# Patient Record
Sex: MALE | Race: WHITE | NOT HISPANIC OR LATINO | Employment: FULL TIME | URBAN - METROPOLITAN AREA
[De-identification: names, ages, dates, MRNs, and addresses within clinical notes are randomized per-mention and may not be internally consistent; named-entity substitution may affect disease eponyms.]

---

## 2021-01-01 ENCOUNTER — APPOINTMENT (OUTPATIENT)
Dept: GENERAL RADIOLOGY | Facility: HOSPITAL | Age: 61
End: 2021-01-01

## 2021-01-01 ENCOUNTER — APPOINTMENT (OUTPATIENT)
Dept: CT IMAGING | Facility: HOSPITAL | Age: 61
End: 2021-01-01

## 2021-01-01 ENCOUNTER — HOSPITAL ENCOUNTER (INPATIENT)
Facility: HOSPITAL | Age: 61
LOS: 1 days | End: 2021-07-24
Attending: EMERGENCY MEDICINE | Admitting: INTERNAL MEDICINE

## 2021-01-01 VITALS
DIASTOLIC BLOOD PRESSURE: 88 MMHG | HEART RATE: 117 BPM | RESPIRATION RATE: 16 BRPM | HEIGHT: 66 IN | SYSTOLIC BLOOD PRESSURE: 160 MMHG | BODY MASS INDEX: 30.79 KG/M2 | TEMPERATURE: 101.8 F | OXYGEN SATURATION: 99 % | WEIGHT: 191.58 LBS

## 2021-01-01 DIAGNOSIS — Z66 DNR (DO NOT RESUSCITATE): ICD-10-CM

## 2021-01-01 DIAGNOSIS — I65.22 LEFT-SIDED CAROTID ARTERY OCCLUSION WITHOUT CEREBRAL INFARCTION: Primary | ICD-10-CM

## 2021-01-01 DIAGNOSIS — I10 SEVERE HYPERTENSION: ICD-10-CM

## 2021-01-01 LAB
ANION GAP SERPL CALCULATED.3IONS-SCNC: 16 MMOL/L (ref 5–15)
BASE EXCESS BLDA CALC-SCNC: 1 MMOL/L (ref -5–5)
BASOPHILS # BLD AUTO: 0.02 10*3/MM3 (ref 0–0.2)
BASOPHILS # BLD AUTO: 0.05 10*3/MM3 (ref 0–0.2)
BASOPHILS NFR BLD AUTO: 0.1 % (ref 0–1.5)
BASOPHILS NFR BLD AUTO: 0.2 % (ref 0–1.5)
BUN SERPL-MCNC: 19 MG/DL (ref 8–23)
BUN/CREAT SERPL: 19 (ref 7–25)
CA-I BLDA-SCNC: 1.28 MMOL/L (ref 1.2–1.32)
CA-I SERPL ISE-MCNC: 1.29 MMOL/L (ref 1.12–1.32)
CALCIUM SPEC-SCNC: 10 MG/DL (ref 8.6–10.5)
CHLORIDE SERPL-SCNC: 102 MMOL/L (ref 98–107)
CO2 BLDA-SCNC: 30 MMOL/L (ref 24–29)
CO2 SERPL-SCNC: 20 MMOL/L (ref 22–29)
CREAT BLDA-MCNC: 0.8 MG/DL (ref 0.6–1.3)
CREAT SERPL-MCNC: 1 MG/DL (ref 0.76–1.27)
DEPRECATED RDW RBC AUTO: 37.6 FL (ref 37–54)
DEPRECATED RDW RBC AUTO: 37.9 FL (ref 37–54)
EOSINOPHIL # BLD AUTO: 0.04 10*3/MM3 (ref 0–0.4)
EOSINOPHIL # BLD AUTO: 0.04 10*3/MM3 (ref 0–0.4)
EOSINOPHIL NFR BLD AUTO: 0.2 % (ref 0.3–6.2)
EOSINOPHIL NFR BLD AUTO: 0.2 % (ref 0.3–6.2)
ERYTHROCYTE [DISTWIDTH] IN BLOOD BY AUTOMATED COUNT: 12.5 % (ref 12.3–15.4)
ERYTHROCYTE [DISTWIDTH] IN BLOOD BY AUTOMATED COUNT: 12.7 % (ref 12.3–15.4)
FLUAV RNA RESP QL NAA+PROBE: NOT DETECTED
FLUBV RNA RESP QL NAA+PROBE: NOT DETECTED
GFR SERPL CREATININE-BSD FRML MDRD: 76 ML/MIN/1.73
GLUCOSE BLDC GLUCOMTR-MCNC: 187 MG/DL (ref 70–130)
GLUCOSE BLDC GLUCOMTR-MCNC: 381 MG/DL (ref 70–130)
GLUCOSE BLDC GLUCOMTR-MCNC: 523 MG/DL (ref 70–130)
GLUCOSE SERPL-MCNC: 210 MG/DL (ref 65–99)
HBA1C MFR BLD: 8 % (ref 4.8–5.6)
HCO3 BLDA-SCNC: 28.3 MMOL/L (ref 22–26)
HCT VFR BLD AUTO: 46.5 % (ref 37.5–51)
HCT VFR BLD AUTO: 47.6 % (ref 37.5–51)
HCT VFR BLDA CALC: 50 % (ref 38–51)
HGB BLD-MCNC: 17 G/DL (ref 13–17.7)
HGB BLD-MCNC: 17.6 G/DL (ref 13–17.7)
HGB BLDA-MCNC: 17 G/DL (ref 12–17)
HOLD SPECIMEN: NORMAL
IMM GRANULOCYTES # BLD AUTO: 0.11 10*3/MM3 (ref 0–0.05)
IMM GRANULOCYTES # BLD AUTO: 0.13 10*3/MM3 (ref 0–0.05)
IMM GRANULOCYTES NFR BLD AUTO: 0.5 % (ref 0–0.5)
IMM GRANULOCYTES NFR BLD AUTO: 0.6 % (ref 0–0.5)
INR PPP: 1.1 (ref 0.8–1.2)
LYMPHOCYTES # BLD AUTO: 1.2 10*3/MM3 (ref 0.7–3.1)
LYMPHOCYTES # BLD AUTO: 2.03 10*3/MM3 (ref 0.7–3.1)
LYMPHOCYTES NFR BLD AUTO: 5.2 % (ref 19.6–45.3)
LYMPHOCYTES NFR BLD AUTO: 8.3 % (ref 19.6–45.3)
MCH RBC QN AUTO: 30.3 PG (ref 26.6–33)
MCH RBC QN AUTO: 30.6 PG (ref 26.6–33)
MCHC RBC AUTO-ENTMCNC: 36.6 G/DL (ref 31.5–35.7)
MCHC RBC AUTO-ENTMCNC: 37 G/DL (ref 31.5–35.7)
MCV RBC AUTO: 82.8 FL (ref 79–97)
MCV RBC AUTO: 82.9 FL (ref 79–97)
MONOCYTES # BLD AUTO: 0.63 10*3/MM3 (ref 0.1–0.9)
MONOCYTES # BLD AUTO: 1.96 10*3/MM3 (ref 0.1–0.9)
MONOCYTES NFR BLD AUTO: 2.7 % (ref 5–12)
MONOCYTES NFR BLD AUTO: 8 % (ref 5–12)
NEUTROPHILS NFR BLD AUTO: 20.16 10*3/MM3 (ref 1.7–7)
NEUTROPHILS NFR BLD AUTO: 21.11 10*3/MM3 (ref 1.7–7)
NEUTROPHILS NFR BLD AUTO: 82.8 % (ref 42.7–76)
NEUTROPHILS NFR BLD AUTO: 91.2 % (ref 42.7–76)
NRBC BLD AUTO-RTO: 0 /100 WBC (ref 0–0.2)
NRBC BLD AUTO-RTO: 0 /100 WBC (ref 0–0.2)
PCO2 BLDA: 64.2 MM HG (ref 35–45)
PH BLDA: 7.25 PH UNITS (ref 7.35–7.6)
PLATELET # BLD AUTO: 286 10*3/MM3 (ref 140–450)
PLATELET # BLD AUTO: 343 10*3/MM3 (ref 140–450)
PMV BLD AUTO: 10.1 FL (ref 6–12)
PMV BLD AUTO: 10.1 FL (ref 6–12)
PO2 BLDA: 80 MMHG (ref 80–105)
POTASSIUM BLDA-SCNC: 3.9 MMOL/L (ref 3.5–4.9)
POTASSIUM SERPL-SCNC: 4.1 MMOL/L (ref 3.5–5.2)
PROTHROMBIN TIME: 12.8 SECONDS (ref 12.8–15.2)
QT INTERVAL: 350 MS
QTC INTERVAL: 511 MS
RBC # BLD AUTO: 5.61 10*6/MM3 (ref 4.14–5.8)
RBC # BLD AUTO: 5.75 10*6/MM3 (ref 4.14–5.8)
SAO2 % BLDA: 93 % (ref 95–98)
SARS-COV-2 RNA RESP QL NAA+PROBE: NOT DETECTED
SODIUM BLD-SCNC: 140 MMOL/L (ref 138–146)
SODIUM SERPL-SCNC: 138 MMOL/L (ref 136–145)
WBC # BLD AUTO: 23.13 10*3/MM3 (ref 3.4–10.8)
WBC # BLD AUTO: 24.35 10*3/MM3 (ref 3.4–10.8)
WHOLE BLOOD HOLD SPECIMEN: NORMAL

## 2021-01-01 PROCEDURE — 70498 CT ANGIOGRAPHY NECK: CPT

## 2021-01-01 PROCEDURE — 0 IOPAMIDOL PER 1 ML: Performed by: EMERGENCY MEDICINE

## 2021-01-01 PROCEDURE — 82565 ASSAY OF CREATININE: CPT

## 2021-01-01 PROCEDURE — 94799 UNLISTED PULMONARY SVC/PX: CPT

## 2021-01-01 PROCEDURE — 99223 1ST HOSP IP/OBS HIGH 75: CPT | Performed by: NURSE PRACTITIONER

## 2021-01-01 PROCEDURE — 85025 COMPLETE CBC W/AUTO DIFF WBC: CPT | Performed by: INTERNAL MEDICINE

## 2021-01-01 PROCEDURE — 82803 BLOOD GASES ANY COMBINATION: CPT

## 2021-01-01 PROCEDURE — 82330 ASSAY OF CALCIUM: CPT

## 2021-01-01 PROCEDURE — 0042T HC CT CEREBRAL PERFUSION W/WO CONTRAST: CPT

## 2021-01-01 PROCEDURE — 63710000001 INSULIN REGULAR HUMAN PER 5 UNITS: Performed by: NURSE PRACTITIONER

## 2021-01-01 PROCEDURE — 5A1935Z RESPIRATORY VENTILATION, LESS THAN 24 CONSECUTIVE HOURS: ICD-10-PCS | Performed by: INTERNAL MEDICINE

## 2021-01-01 PROCEDURE — 99238 HOSP IP/OBS DSCHRG MGMT 30/<: CPT | Performed by: INTERNAL MEDICINE

## 2021-01-01 PROCEDURE — 71045 X-RAY EXAM CHEST 1 VIEW: CPT

## 2021-01-01 PROCEDURE — 94003 VENT MGMT INPAT SUBQ DAY: CPT

## 2021-01-01 PROCEDURE — 99223 1ST HOSP IP/OBS HIGH 75: CPT | Performed by: INTERNAL MEDICINE

## 2021-01-01 PROCEDURE — 85610 PROTHROMBIN TIME: CPT

## 2021-01-01 PROCEDURE — 87636 SARSCOV2 & INF A&B AMP PRB: CPT | Performed by: INTERNAL MEDICINE

## 2021-01-01 PROCEDURE — 85014 HEMATOCRIT: CPT

## 2021-01-01 PROCEDURE — 70496 CT ANGIOGRAPHY HEAD: CPT

## 2021-01-01 PROCEDURE — 84132 ASSAY OF SERUM POTASSIUM: CPT

## 2021-01-01 PROCEDURE — 70450 CT HEAD/BRAIN W/O DYE: CPT

## 2021-01-01 PROCEDURE — 82947 ASSAY GLUCOSE BLOOD QUANT: CPT

## 2021-01-01 PROCEDURE — 25010000002 FENTANYL CITRATE (PF) 50 MCG/ML SOLUTION: Performed by: EMERGENCY MEDICINE

## 2021-01-01 PROCEDURE — 83036 HEMOGLOBIN GLYCOSYLATED A1C: CPT | Performed by: NURSE PRACTITIONER

## 2021-01-01 PROCEDURE — 80048 BASIC METABOLIC PNL TOTAL CA: CPT | Performed by: INTERNAL MEDICINE

## 2021-01-01 PROCEDURE — 94002 VENT MGMT INPAT INIT DAY: CPT

## 2021-01-01 PROCEDURE — 93005 ELECTROCARDIOGRAM TRACING: CPT | Performed by: EMERGENCY MEDICINE

## 2021-01-01 PROCEDURE — 84295 ASSAY OF SERUM SODIUM: CPT

## 2021-01-01 PROCEDURE — 25010000002 MIDAZOLAM PER 1 MG: Performed by: EMERGENCY MEDICINE

## 2021-01-01 PROCEDURE — 99285 EMERGENCY DEPT VISIT HI MDM: CPT

## 2021-01-01 PROCEDURE — 82962 GLUCOSE BLOOD TEST: CPT

## 2021-01-01 PROCEDURE — 85025 COMPLETE CBC W/AUTO DIFF WBC: CPT | Performed by: EMERGENCY MEDICINE

## 2021-01-01 PROCEDURE — 82330 ASSAY OF CALCIUM: CPT | Performed by: INTERNAL MEDICINE

## 2021-01-01 PROCEDURE — 74018 RADEX ABDOMEN 1 VIEW: CPT

## 2021-01-01 RX ORDER — SODIUM CHLORIDE 0.9 % (FLUSH) 0.9 %
10 SYRINGE (ML) INJECTION AS NEEDED
Status: DISCONTINUED | OUTPATIENT
Start: 2021-01-01 | End: 2021-01-01 | Stop reason: HOSPADM

## 2021-01-01 RX ORDER — ACETAMINOPHEN 325 MG/1
325 TABLET ORAL EVERY 6 HOURS PRN
Status: DISCONTINUED | OUTPATIENT
Start: 2021-01-01 | End: 2021-01-01

## 2021-01-01 RX ORDER — ACETAMINOPHEN 325 MG/1
650 TABLET ORAL EVERY 6 HOURS PRN
Status: DISCONTINUED | OUTPATIENT
Start: 2021-01-01 | End: 2021-01-01

## 2021-01-01 RX ORDER — DEXTROSE MONOHYDRATE 25 G/50ML
25 INJECTION, SOLUTION INTRAVENOUS
Status: DISCONTINUED | OUTPATIENT
Start: 2021-01-01 | End: 2021-01-01

## 2021-01-01 RX ORDER — ACETAMINOPHEN 650 MG/1
650 SUPPOSITORY RECTAL EVERY 4 HOURS PRN
Status: DISCONTINUED | OUTPATIENT
Start: 2021-01-01 | End: 2021-01-01 | Stop reason: HOSPADM

## 2021-01-01 RX ORDER — ACETAMINOPHEN 325 MG/1
325 TABLET ORAL EVERY 6 HOURS PRN
Status: DISCONTINUED | OUTPATIENT
Start: 2021-01-01 | End: 2021-01-01 | Stop reason: HOSPADM

## 2021-01-01 RX ORDER — NICOTINE POLACRILEX 4 MG
15 LOZENGE BUCCAL
Status: DISCONTINUED | OUTPATIENT
Start: 2021-01-01 | End: 2021-01-01

## 2021-01-01 RX ORDER — MORPHINE SULFATE 2 MG/ML
2 INJECTION, SOLUTION INTRAMUSCULAR; INTRAVENOUS
Status: DISCONTINUED | OUTPATIENT
Start: 2021-01-01 | End: 2021-01-01 | Stop reason: HOSPADM

## 2021-01-01 RX ORDER — LORAZEPAM 2 MG/ML
1 INJECTION INTRAMUSCULAR EVERY 4 HOURS PRN
Status: DISCONTINUED | OUTPATIENT
Start: 2021-01-01 | End: 2021-01-01 | Stop reason: HOSPADM

## 2021-01-01 RX ORDER — MIDAZOLAM HYDROCHLORIDE 1 MG/ML
5 INJECTION INTRAMUSCULAR; INTRAVENOUS ONCE
Status: COMPLETED | OUTPATIENT
Start: 2021-01-01 | End: 2021-01-01

## 2021-01-01 RX ORDER — GLYCOPYRROLATE 0.2 MG/ML
0.2 INJECTION INTRAMUSCULAR; INTRAVENOUS
Status: DISCONTINUED | OUTPATIENT
Start: 2021-01-01 | End: 2021-01-01 | Stop reason: HOSPADM

## 2021-01-01 RX ORDER — ATORVASTATIN CALCIUM 40 MG/1
40 TABLET, FILM COATED ORAL DAILY
Status: DISCONTINUED | OUTPATIENT
Start: 2021-01-01 | End: 2021-01-01

## 2021-01-01 RX ORDER — MORPHINE SULFATE 4 MG/ML
4 INJECTION, SOLUTION INTRAMUSCULAR; INTRAVENOUS
Status: DISCONTINUED | OUTPATIENT
Start: 2021-01-01 | End: 2021-01-01 | Stop reason: HOSPADM

## 2021-01-01 RX ORDER — FENTANYL CITRATE 50 UG/ML
100 INJECTION, SOLUTION INTRAMUSCULAR; INTRAVENOUS ONCE
Status: COMPLETED | OUTPATIENT
Start: 2021-01-01 | End: 2021-01-01

## 2021-01-01 RX ORDER — MORPHINE SULFATE 4 MG/ML
4 INJECTION, SOLUTION INTRAMUSCULAR; INTRAVENOUS
Status: DISCONTINUED | OUTPATIENT
Start: 2021-01-01 | End: 2021-01-01

## 2021-01-01 RX ORDER — ACETAMINOPHEN 325 MG/1
650 TABLET ORAL EVERY 6 HOURS PRN
Status: DISCONTINUED | OUTPATIENT
Start: 2021-01-01 | End: 2021-01-01 | Stop reason: HOSPADM

## 2021-01-01 RX ORDER — MORPHINE SULFATE 2 MG/ML
2 INJECTION, SOLUTION INTRAMUSCULAR; INTRAVENOUS
Status: DISCONTINUED | OUTPATIENT
Start: 2021-01-01 | End: 2021-01-01

## 2021-01-01 RX ORDER — GLYCOPYRROLATE 0.2 MG/ML
0.4 INJECTION INTRAMUSCULAR; INTRAVENOUS ONCE
Status: COMPLETED | OUTPATIENT
Start: 2021-01-01 | End: 2021-01-01

## 2021-01-01 RX ADMIN — NICARDIPINE HYDROCHLORIDE 10 MG/HR: 0.1 INJECTION, SOLUTION INTRAVENOUS at 18:29

## 2021-01-01 RX ADMIN — NICARDIPINE HYDROCHLORIDE 12.5 MG/HR: 0.1 INJECTION, SOLUTION INTRAVENOUS at 22:38

## 2021-01-01 RX ADMIN — ACETAMINOPHEN 650 MG: 325 TABLET ORAL at 02:01

## 2021-01-01 RX ADMIN — GLYCOPYRROLATE 0.4 MG: 0.2 INJECTION INTRAMUSCULAR; INTRAVENOUS at 10:50

## 2021-01-01 RX ADMIN — NICARDIPINE HYDROCHLORIDE 15 MG/HR: 0.1 INJECTION, SOLUTION INTRAVENOUS at 21:23

## 2021-01-01 RX ADMIN — IOPAMIDOL 115 ML: 755 INJECTION, SOLUTION INTRAVENOUS at 18:24

## 2021-01-01 RX ADMIN — MIDAZOLAM 5 MG: 1 INJECTION INTRAMUSCULAR; INTRAVENOUS at 18:23

## 2021-01-01 RX ADMIN — FENTANYL CITRATE 100 MCG: 50 INJECTION, SOLUTION INTRAMUSCULAR; INTRAVENOUS at 18:23

## 2021-01-01 RX ADMIN — INSULIN HUMAN 14 UNITS: 100 INJECTION, SOLUTION PARENTERAL at 23:51

## 2021-01-01 RX ADMIN — NICARDIPINE HYDROCHLORIDE 7.5 MG/HR: 0.1 INJECTION, SOLUTION INTRAVENOUS at 07:25

## 2021-01-01 RX ADMIN — INSULIN HUMAN 2 UNITS: 100 INJECTION, SOLUTION PARENTERAL at 06:19

## 2021-01-01 RX ADMIN — NICARDIPINE HYDROCHLORIDE 15 MG/HR: 0.1 INJECTION, SOLUTION INTRAVENOUS at 20:05

## 2021-07-23 PROBLEM — J96.01 ACUTE RESPIRATORY FAILURE WITH HYPOXIA (HCC): Status: ACTIVE | Noted: 2021-01-01

## 2021-07-23 PROBLEM — I65.22 LEFT CAROTID ARTERY OCCLUSION: Status: ACTIVE | Noted: 2021-01-01

## 2021-07-23 PROBLEM — I65.22: Status: RESOLVED | Noted: 2021-01-01 | Resolved: 2021-01-01

## 2021-07-23 PROBLEM — I63.9 ACUTE CVA (CEREBROVASCULAR ACCIDENT) (HCC): Status: ACTIVE | Noted: 2021-01-01

## 2021-07-23 PROBLEM — I65.22: Status: ACTIVE | Noted: 2021-01-01

## 2021-07-23 NOTE — ED PROVIDER NOTES
Subjective   Patient presents via air medical transport secondary to alteration in mental status and likely COVID-19.  Patient apparently per reports from the crew was not feeling well this morning and went back to bed.  The wife went in this afternoon and was unable to wake the patient.  EMS arrived and he was unresponsive.  The flight crew arrived and he had some movement and agonal respirations but unresponsive.  He was intubated in route.  No medications were provided.  GCS of 6.  Significant hypertension per the flight crew.  Patient is not on any reported blood thinners.      History provided by:  EMS personnel  History limited by:  Intubated and patient unresponsive      Review of Systems   Unable to perform ROS: Intubated       No past medical history on file.    Not on File    No past surgical history on file.    No family history on file.             Objective   Physical Exam  Vitals and nursing note reviewed.   Constitutional:       Appearance: He is obese.      Comments: Patient is overall unresponsive.  Some spontaneous respirations.  Withdraws to pain with stimulation of the lower extremities and left arm.  GCS of 6.   Cardiovascular:      Rate and Rhythm: Normal rate and regular rhythm.      Pulses: Normal pulses.   Pulmonary:      Effort: Pulmonary effort is normal. No respiratory distress.      Breath sounds: Normal breath sounds.   Abdominal:      Palpations: Abdomen is soft.   Musculoskeletal:      Comments: Withdraws to pain in the left upper extremity and bilateral lower extremities.   Skin:     General: Skin is warm and dry.      Capillary Refill: Capillary refill takes less than 2 seconds.   Neurological:      Mental Status: He is unresponsive.      GCS: GCS eye subscore is 1. GCS verbal subscore is 1. GCS motor subscore is 4.         Critical Care  Performed by: Felipe Forbes MD  Authorized by: Felipe Forbes MD     Critical care provider statement:     Critical care time  (minutes):  60    Critical care end time:  7/23/2021 7:24 PM    Critical care time was exclusive of:  Separately billable procedures and treating other patients    Critical care was necessary to treat or prevent imminent or life-threatening deterioration of the following conditions:  CNS failure or compromise, circulatory failure and cardiac failure    Critical care was time spent personally by me on the following activities:  Development of treatment plan with patient or surrogate, discussions with consultants, evaluation of patient's response to treatment, examination of patient, ventilator management, obtaining history from patient or surrogate, ordering and performing treatments and interventions, ordering and review of laboratory studies, ordering and review of radiographic studies, pulse oximetry and re-evaluation of patient's condition               ED Course  ED Course as of Jul 23 1927 Fri Jul 23, 2021 1823 I personally reviewed the CT scan as it was being performed as well as discussed it with the radiologist.  Patient with a large acute appearing insult to the left hemisphere.  See report for radiology for details.   CT Head Without Contrast Stroke Protocol [RS]   1823 I evaluated the patient on arrival with the stroke navigator.  Patient with significant alteration in mental status, intubated prior to arrival.  GCS is 6.  Patient's only neurologic function is withdrawing to pain of the bilateral lower extremities and left upper extremity.    [RS]   1824 Patient not a candidate for TPA secondary to  significantly elevated NIH stroke scale as well as time since last known well.    [RS]   1829 BP(!): 219/109 [RS]   1829 Heart Rate(!): 122 [RS]   1846 WBC(!): 23.13 [RS]   1854 The stroke navigator apparently can talk with the family who elected to make the patient a DNR and nonintervention.  I called to talk with the wife to confirm that decision.  Secondary to the patient's age and potential disability.   The wife wants to discuss it with her son.    [RS]   1920 The family is now at bedside and I had a lengthy discussion with the wife and the son.  Stroke navigators were at the bedside as well.  After discussion the options of interventional procedure versus supportive measures versus withdrawal of care.  The wife and son both agree that the patient would not want any invasive procedures.  They have asked that we maintain supportive measures at this point while other family travels from [place].  This decision was confirmed with the wife, son and in front of the stroke navigator's.    [RS]   1927 Case discussed with Dr. Espinoza who will admit.    [RS]      ED Course User Index  [RS] Felipe Forbes MD                                           MDM  Number of Diagnoses or Management Options  DNR (do not resuscitate)  Left-sided carotid artery occlusion without cerebral infarction  Severe hypertension  Diagnosis management comments: Recent Results (from the past 24 hour(s))  -POC Creatinine  Collection Time: 07/23/21  6:25 PM  Specimen: Blood       Result                      Value             Ref Range           Creatinine                  0.80              0.60 - 1.30 *  -POC Surgery Labs  Collection Time: 07/23/21  6:29 PM  Specimen: Blood       Result                      Value             Ref Range           Ionized Calcium             1.28              1.20 - 1.32 *       POC Potassium               3.9               3.5 - 4.9 mm*       Sodium                      140               138 - 146 mm*       Total CO2                   30 (H)            24 - 29 mmol*       Hemoglobin                  17.0              12.0 - 17.0 *       Hematocrit                  50                38 - 51 %           pCO2, Arterial              64.2 (H)          35 - 45 mm Hg       pO2, Arterial               80                80 - 105 mmHg       Base Excess                 1.0000            -5 - 5 mmol/L       O2 Saturation,  Arterial     93 (L)            95 - 98 %           pH, Arterial                7.25 (L)          7.35 - 7.6 p*       HCO3, Arterial              28.3 (H)          22 - 26 mmol*       Glucose                     381 (H)           70 - 130 mg/*  -CBC Auto Differential  Collection Time: 07/23/21  6:33 PM  Specimen: Blood       Result                      Value             Ref Range           WBC                         23.13 (H)         3.40 - 10.80*       RBC                         5.75              4.14 - 5.80 *       Hemoglobin                  17.6              13.0 - 17.7 *       Hematocrit                  47.6              37.5 - 51.0 %       MCV                         82.8              79.0 - 97.0 *       MCH                         30.6              26.6 - 33.0 *       MCHC                        37.0 (H)          31.5 - 35.7 *       RDW                         12.5              12.3 - 15.4 %       RDW-SD                      37.6              37.0 - 54.0 *       MPV                         10.1              6.0 - 12.0 fL       Platelets                   343               140 - 450 10*       Neutrophil %                91.2 (H)          42.7 - 76.0 %       Lymphocyte %                5.2 (L)           19.6 - 45.3 %       Monocyte %                  2.7 (L)           5.0 - 12.0 %        Eosinophil %                0.2 (L)           0.3 - 6.2 %         Basophil %                  0.1               0.0 - 1.5 %         Immature Grans %            0.6 (H)           0.0 - 0.5 %         Neutrophils, Absolute       21.11 (H)         1.70 - 7.00 *       Lymphocytes, Absolute       1.20              0.70 - 3.10 *       Monocytes, Absolute         0.63              0.10 - 0.90 *       Eosinophils, Absolute       0.04              0.00 - 0.40 *       Basophils, Absolute         0.02              0.00 - 0.20 *       Immature Grans, Absolu*     0.13 (H)          0.00 - 0.05 *       nRBC                        0.0                0.0 - 0.2 /1*  -POC Protime / INR  Collection Time: 07/23/21  6:33 PM  Specimen: Blood       Result                      Value             Ref Range           Protime                     12.8              12.8 - 15.2 *       INR                         1.1               0.8 - 1.2      Note: In addition to lab results from this visit, the labs listed above may include labs taken at another facility or during a different encounter within the last 24 hours. Please correlate lab times with ED admission and discharge times for further clarification of the services performed during this visit.    CT Angiogram Head w AI Analysis of LVO   Preliminary Result    Severe atherosclerotic disease throughout the CTA head and    neck with diminutive caliber and superimposed disease likely within the    bilateral vertebral arteries lack of opacification in the proximal V2    segments and distally throughout the vertebral basilar system and    basilar artery with irregularities throughout the PCA territories.         Occluded left internal carotid artery just distal to its origin with    continued lack of opacification left ICA to the intracranial portions    and only minimal reconstitution the left MCA with irregularities    throughout the bilateral MCA territories and decreased opacification    corresponding left MCA territory greater than right. Additional findings    of abnormalities irregularities in the SIA territory right and left of    moderate to severe stenoses               CT CEREBRAL PERFUSION WITH & WITHOUT CONTRAST   Preliminary Result    Abnormal perfusion with large area of her bursal ischemia    involving the left cerebral hemisphere with core infarct or    nonreversible ischemia in the left temporal occipital region as well as    reversible ischemia in the right posterior SIA territory.               CT Angiogram Neck   Preliminary Result    Severe atherosclerotic disease throughout the CTA head and    neck  with diminutive caliber and superimposed disease likely within the    bilateral vertebral arteries lack of opacification in the proximal V2    segments and distally throughout the vertebral basilar system and    basilar artery with irregularities throughout the PCA territories.         Occluded left internal carotid artery just distal to its origin with    continued lack of opacification left ICA to the intracranial portions    and only minimal reconstitution the left MCA with irregularities    throughout the bilateral MCA territories and decreased opacification    corresponding left MCA territory greater than right. Additional findings    of abnormalities irregularities in the SIA territory right and left of    moderate to severe stenoses               CT Head Without Contrast Stroke Protocol   Preliminary Result    Low-attenuation area extending to the cortex left    temporoparietal region with sulcal effacement and edema concerning for    evolving left MCA territory infarction         Scan performed on 07/23/2021 at 1814 hours. Scan report given to ER    physician Dr. Forbes in person by Dr. Ramesh at scanner on 07/23/2021 1820    hours               XR Chest 1 View    (Results Pending)  --------------------------------------------------------------            07/23/21 07/23/21 07/23/21 07/23/21                1845          1848          1900      1915      --------------------------------------------------------------   BP:     (!) 218/111                 (!) 186/91(!) 170/110   BP Location:                            Right arm               Patient Position:                             Sitting                Pulse:    (!) 125                    (!) 123    (!) 130     Resp:                                   16                  Temp:              98.2 °F (36.8 °C)                        TempSrc:               Axillary                              "SpO2:       98%                        98%        98%       Weight:            97.5 kg (215 lb)                         Height:             167.6 cm (66\")                         --------------------------------------------------------------  Medications  sodium chloride 0.9 % flush 10 mL (has no administration in time range)  niCARdipine (CARDENE) 20 mg in 200 mL NS infusion (15 mg/hr Intravenous Rate/Dose Change 7/23/21 1901)  fentaNYL citrate (PF) (SUBLIMAZE) injection 100 mcg (100 mcg Intravenous Given 7/23/21 1823)  midazolam (VERSED) injection 5 mg (5 mg Intravenous Given 7/23/21 1823)  iopamidol (ISOVUE-370) 76 % injection 150 mL (115 mL Intravenous Given 7/23/21 1824)  ECG/EMG Results (last 24 hours)     Procedure Component Value Units Date/Time    ECG 12 Lead ([id number]) Collected: 07/23/21 1847     Updated: 07/23/21 1847      ECG 12 Lead               Amount and/or Complexity of Data Reviewed  Clinical lab tests: reviewed  Tests in the radiology section of CPT®: reviewed  Decide to obtain previous medical records or to obtain history from someone other than the patient: yes  Obtain history from someone other than the patient: yes  Discuss the patient with other providers: yes  Independent visualization of images, tracings, or specimens: yes    Risk of Complications, Morbidity, and/or Mortality  Presenting problems: high    Critical Care  Total time providing critical care: 30-74 minutes      Final diagnoses:   Left-sided carotid artery occlusion without cerebral infarction   Severe hypertension   DNR (do not resuscitate)       ED Disposition  ED Disposition     ED Disposition Condition Comment    Decision to Admit  Level of Care: Critical Care [6]   Admitting Physician: VALERIE GOMEZ [[id number]]            No follow-up provider specified.       Medication List      No changes were made to your prescriptions during this visit.          Felipe Forbes MD  07/23/21 1926     "   Felipe Forbes MD  07/23/21 1927

## 2021-07-23 NOTE — CONSULTS
Stroke Consult Note    Patient Name: Serge Keith   MRN: [id number]  Age: 61 y.o.  Sex: male  : 1960    Primary Care Physician: No primary care provider on file.  Referring Physician:  No ref. provider found    TIME STROKE TEAM CALLED:  EST     TIME PATIENT SEEN:  EST    Handedness: unknown    Race:       Chief Complaint/Reason for Consultation: unresponsive     HPI:   Serge Keith is a 61 year old  male with unknown medical history. He presents to [facility] ED today as a scene flight due to unresponsiveness. He is intubated but not sedated. He is non-verbal at this time. He is responsive to painful stimuli on bilateral lower extremities R<L. Minimally responsive in left upper extremity.  No response in right upper extremity to painful stimuli.  Per EMS report patient was last in his usual state of health last night before bed.  This morning when he did not get up his wife went to check on him and he would not get out of bed but did speak to her and had slurred words.  Wife reports he had been doing vigorous labor for the last 2 days and she felt he needed more rest so allowed him to sleep several more hours.  When she went back to check on him in the afternoon he was completely unresponsive and had lost control of his bowel and bladder.  EMS was called.    On arrival to our facility he was taken urgently for advanced imaging.  CT of the head without contrast demonstrated some low-attenuation changes in the left MCA territory concerning for acute infarction.  CTA of the head and neck demonstrated a left ICA occlusion and CT perfusion confirmed large area of ischemia  involving the left cerebral hemisphere with core infarct or nonreversible ischemia in the left temporal occipital region as well as reversible ischemia in the right posterior SIA territory.  Imaging was reviewed with neuro interventionalists on-call.  Patient was a candidate for mechanical thrombectomy.  I reached  out to patient's wife who was in route with her son to the hospital.  I discussed with patient's wife his current condition and the stroke that he was currently having.  I reviewed with her risks and benefits of proceeding with going to the Cath Lab.  I answered all questions concerning procedure and possible outcomes.  Ultimately she expressed his wishes would not be to pursue aggressive treatment and that she does not wish for him to go for the procedure.  She discussed this with her son at that time.  Dr. Forbes the ED physician also spoke with family about CODE STATUS and goals of care.  At that time he also explained the procedure again himself.  They took a few moments to consider again and after group discussion again decided they would not like to proceed.  They do however wish to continue mechanical ventilation at this time and medical management so that family can come from [place] to be with the patient.         Last Known Normal Date/Time: yesterday evening      Review of Systems   Unable to perform ROS: Patient unresponsive        Temp:  [98.2 °F (36.8 °C)] 98.2 °F (36.8 °C)  Heart Rate:  [122] 122  Resp:  [18] 18  BP: (219)/(109) 219/109    Neurological Exam  Mental Status  Responsive to painful stimuli. Patient is nonverbal.  Patient is intubated and unresponsive..    Motor    Withdraws to painful stimuli in BLE and LUE. No movement in RUE.    Sensory  Withdraws to painful stimuli in BLE and LUE. No movement in RUE.     Reflexes                                           Right                      Left  Plantar                           Downgoing                Upgoing    Coordination  Patient unable to participate .    Gait  Patient unable to participate .      Physical Exam  Vitals reviewed.   Constitutional:       Appearance: He is ill-appearing.   HENT:      Head: Normocephalic.      Mouth/Throat:      Comments: Intubated   Cardiovascular:      Rate and Rhythm: Tachycardia present.   Pulmonary:       Effort: No respiratory distress.   Skin:     General: Skin is warm and dry.   Neurological:      Cranial Nerves: Cranial nerve deficit present.      Sensory: Sensory deficit present.      Motor: Weakness present.         Acute Stroke Data    Alteplase (tPA) Inclusion / Exclusion Criteria    Time: 18:51 EDT  Person Administering Scale: ARIN Tejeda    Inclusion Criteria  [x]   18 years of age or greater   []   Onset of symptoms < 4.5 hours before beginning treatment (stroke onset = time patient was last seen well or without symptoms).   []   Diagnosis of acute ischemic stroke causing measurable disabling deficit (Complete Hemianopia, Any Aphasia, Visual or Sensory Extinction, Any weakness limiting sustained effort against gravity)   []   Any remaining deficit considered potentially disabling in view of patient and practitioner   Exclusion criteria (Do not proceed with Alteplase if any are checked under exclusion criteria)  [x]   Onset unknown or GREATER than 4.5 hours   []   ICH on CT/MRI   []   CT demonstrates hypodensity representing acute or subacute infarct   []   Significant head trauma or prior stroke in the previous 3 months   []   Symptoms suggestive of subarachnoid hemorrhage   []   History of un-ruptured intracranial aneurysm GREATER than 10 mm   []   Recent intracranial or intraspinal surgery within the last 3 months   []   Arterial puncture at a non-compressible site in the previous 7 days   []   Active internal bleeding   []   Acute bleeding tendency   []   Platelet count LESS than 100,000 for known hematological diseases such as leukemia, thrombocytopenia or chronic cirrhosis   []   Current use of anticoagulant with INR GREATER than 1.7 or PT GREATER than 15 seconds, aPTT GREATER than 40 seconds   []   Heparin received within 48 hours, resulting in abnormally elevated aPTT GREATER than upper limit of normal   []   Current use of direct thrombin inhibitors or direct factor Xa inhibitors  in the past 48 hours   []   Elevated blood pressure refractory to treatment (systolic GREATER than 185 mm/Hg or diastolic  GREATER than 110 mm/Hg   []   Suspected infective endocarditis and aortic arch dissection   []   Current use of therapeutic treatment dose of low-molecular-weight heparin (LMWH) within the previous 24 hours   []   Structural GI malignancy or bleed   Relative exclusion for all patients  []   Only minor non-disabling symptoms   []   Pregnancy   []   Seizure at onset with postictal residual neurological impairments   []   Major surgery or previous trauma within past 14 days   []   History of previous spontaneous ICH, intracranial neoplasm, or AV malformation   []   Postpartum (within previous 14 days)   []   Recent GI or urinary tract hemorrhage (within previous 21 days)   []   Recent acute MI (within previous 3 months)   []   History of un-ruptured intracranial aneurysm LESS than 10 mm   []   History of ruptured intracranial aneurysm   []   Blood glucose LESS than 50 mg/dL (2.7 mmol/L)   []   Dural puncture within the last 7 days   []   Known GREATER than 10 cerebral microbleeds   Additional exclusions for patients with symptoms onset between 3 and 4.5 hours.  []   Age > 80.   []   On any anticoagulants regardless of INR  >>> Warfarin (Coumadin), Heparin, Enoxaparin (Lovenox), fondaparinux (Arixtra), bivalirudin (Angiomax), Argatroban, dabigatran (Pradaxa), rivaroxaban (Xarelto), or apixaban (Eliquis)   []   Severe stroke (NIHSS > 25).   []   History of BOTH diabetes and previous ischemic stroke.   []   The risks and benefits have been discussed with the patient or family related to the administration of IV Alteplase for stroke symptoms.   []   I have discussed and reviewed the patient's case and imaging with the attending prior to IV Alteplase.   Not given  Time Alteplase administered       No past medical history on file.  No past surgical history on file.  No family history on file.     Not on  File  Prior to Admission medications    Not on File       Hospital Meds:  Scheduled-    Infusions- niCARdipine, 5-15 mg/hr, Last Rate: 12.5 mg/hr (07/23/21 1847)       PRNs- sodium chloride    Functional Status Prior to Current Stroke/[place] Score: 0    NIH Stroke Scale  Time: 18:51 EDT  Person Administering Scale: ARIN Tejeda       1a. Level of Consciousness: 2-->Not alert, requires repeated stimulation to attend, or is obtunded and requires strong or painful stimulation to make movements (not stereotyped)  1b. LOC Questions: 1-->Answers one question correctly  1c. LOC Commands: 2-->Performs neither task correctly  2. Best Gaze: 0-->Normal  3. Visual: 3-->Bilateral hemianopia (blind including cortical blindness)  4. Facial Palsy: 2-->Partial paralysis (total or near-total paralysis of lower face)  5a. Motor Arm, Left: 3-->No effort against gravity, limb falls  5b. Motor Arm, Right: 4-->No movement  6a. Motor Leg, Left: 3-->No effort against gravity, leg falls to bed immediately  6b. Motor Leg, Right: 3-->No effort against gravity, leg falls to bed immediately  7. Limb Ataxia: 0-->Absent  8. Sensory: 1-->Mild-to-moderate sensory loss, patient feels pinprick is less sharp or is dull on the affected side, or there is a loss of superficial pain with pinprick, but patient is aware of being touched  9. Best Language: 3-->Mute, global aphasia, no usable speech or auditory comprehension  10. Dysarthria: (UN) Intubated or other physical barrier  11. Extinction and Inattention (formerly Neglect): 0-->No abnormality    Total (NIH Stroke Scale): 27      Results Reviewed:  I have personally reviewed current lab, radiology, and data and agree with results.  Lab Results (last 24 hours)     Procedure Component Value Units Date/Time    POC Creatinine [[id number]]  (Normal) Collected: 07/23/21 1825    Specimen: Blood Updated: 07/23/21 1850     Creatinine 0.80 mg/dL      Comment: Serial Number: [id number]:  [id number]        CBC & Differential [[id number]]  (Abnormal) Collected: 07/23/21 1833    Specimen: Blood Updated: 07/23/21 1841    Narrative:      The following orders were created for panel order CBC & Differential.  Procedure                               Abnormality         Status                     ---------                               -----------         ------                     CBC Auto Differential[[id number]]        Abnormal            Final result                 Please view results for these tests on the individual orders.    CBC Auto Differential [[id number]]  (Abnormal) Collected: 07/23/21 1833    Specimen: Blood Updated: 07/23/21 1841     WBC 23.13 10*3/mm3      RBC 5.75 10*6/mm3      Hemoglobin 17.6 g/dL      Hematocrit 47.6 %      MCV 82.8 fL      MCH 30.6 pg      MCHC 37.0 g/dL      RDW 12.5 %      RDW-SD 37.6 fl      MPV 10.1 fL      Platelets 343 10*3/mm3      Neutrophil % 91.2 %      Lymphocyte % 5.2 %      Monocyte % 2.7 %      Eosinophil % 0.2 %      Basophil % 0.1 %      Immature Grans % 0.6 %      Neutrophils, Absolute 21.11 10*3/mm3      Lymphocytes, Absolute 1.20 10*3/mm3      Monocytes, Absolute 0.63 10*3/mm3      Eosinophils, Absolute 0.04 10*3/mm3      Basophils, Absolute 0.02 10*3/mm3      Immature Grans, Absolute 0.13 10*3/mm3      nRBC 0.0 /100 WBC     POC Protime / INR [[id number]]  (Normal) Collected: 07/23/21 1833    Specimen: Blood Updated: 07/23/21 1836     Protime 12.8 seconds      INR 1.1     Comment: Serial Number: [id number]:  [id number]       POC Surgery Labs [[id number]]  (Abnormal) Collected: 07/23/21 1829    Specimen: Blood Updated: 07/23/21 1835     Ionized Calcium 1.28 mmol/L      POC Potassium 3.9 mmol/L      Sodium 140 mmol/L      Total CO2 30 mmol/L      Hemoglobin 17.0 g/dL      Hematocrit 50 %      pCO2, Arterial 64.2 mm Hg      pO2, Arterial 80 mmHg      Comment: Serial Number: [id number]:  [id number]        Base Excess 1.0000 mmol/L      O2 Saturation, Arterial 93 %      pH,  Arterial 7.25 pH units      HCO3, Arterial 28.3 mmol/L      Glucose 381 mg/dL     Green Top (Gel) [[id number]] Collected: 07/23/21 1833    Specimen: Blood Updated: 07/23/21 1833    Lavender Top [[id number]] Collected: 07/23/21 1833    Specimen: Blood Updated: 07/23/21 1833    Gold Top - SST [[id number]] Collected: 07/23/21 1833    Specimen: Blood Updated: 07/23/21 1833    [place] Draw [[id number]] Collected: 07/23/21 1833    Specimen: Blood Updated: 07/23/21 1833    Narrative:      The following orders were created for panel order [place] Draw.  Procedure                               Abnormality         Status                     ---------                               -----------         ------                     Green Top (Gel)[[id number]]                                  In process                 Lavender Top[[id number]]                                     In process                 Gold Top - SST[[id number]]                                   In process                 Park Top[[id number]]                                         In process                   Please view results for these tests on the individual orders.    Gray Top [[id number]] Collected: 07/23/21 1833    Specimen: Blood Updated: 07/23/21 1833        Imaging Results (Last 24 Hours)     Procedure Component Value Units Date/Time    CT Head Without Contrast Stroke Protocol [[id number]] Collected: 07/23/21 1833     Updated: 07/23/21 1836    Narrative:      EXAMINATION: CT HEAD WO CONTRAST STROKE PROTOCOL-      INDICATION: Stroke, follow up      TECHNIQUE: CT head without intervenous contrast stroke protocol     The radiation dose reduction device was turned on for each scan per the  ALARA (As Low as Reasonably Achievable) protocol.     COMPARISON: NONE     FINDINGS: Abnormal asymmetry of low attenuation and sulcal effacement  extending to the periphery of the cortex left temporoparietal region  concerning for evolving infarction without intra-axial major  extra-axial  fluid collection. No midline shift or hydrocephalus. Globes and orbits  unremarkable. Paranasal sinuses and mastoid cells demonstrate mucus  retention cyst and mucosal edema within the ethmoid air cells patient is  intubated with partially visualized endotracheal tube. Calvarium intact             Impression:      Low-attenuation area extending to the cortex left  temporoparietal region with sulcal effacement and edema concerning for  evolving left MCA territory infarction     Scan performed on 07/23/2021 at 1814 hours. Scan report given to ER  physician Dr. Forbes in person by Dr. Ramesh at scanner on 07/23/2021 1820  hours          CT Angiogram Head w AI Analysis of LVO [[id number]] Resulted: 07/23/21 1822     Updated: 07/23/21 1836    CT CEREBRAL PERFUSION WITH & WITHOUT CONTRAST [[id number]] Resulted: 07/23/21 1849     Updated: 07/23/21 1836    CT Angiogram Neck [[id number]] Resulted: 07/23/21 1822     Updated: 07/23/21 1836    XR Chest 1 View [[id number]] Resulted: 07/23/21 1820     Updated: 07/23/21 1824            Assessment/Plan:  This is a 61 year old  male with unknown medical history. He presents to [facility] ED today as a scene flight due to unresponsiveness. He was found to have a left ICA occlusion and all treatment options were discussed, including mechanical thrombectomy, at length with family and ultimately his wife decided he would not wish to have aggressive treatment. He was not a candidate for tPA d/t extended time window.       1. Acute left MCA stroke  -Initiate TIA/CVA without thrombolytic therapy standing order set  -Imaging reviewed as above with Dr. Herr with neuro intervention.  Patient has a left ICA occlusion for which mechanical thrombectomy was offered to family.  Aggressive treatment was not in alignment with his wishes.  We will proceed with medical management at this time  -We will obtain MRI brain without contrast  -Echo, lipid panel, hemoglobin A1c  -Aspirin and  statin  -Allow for permissive hypertension per CVA standing order set      2.  Goals of care--wife reports family will be coming in from [place] and she would like to continue mechanical ventilation and all medical support till that time.  I do feel that palliative care would be an appropriate consult over the next 24 hours to provide support and decision making.    Discussed plan of care with wife and son at bedside along with Dr. Forbes.  Stroke neurology will continue to follow.  Thank you for this consult and involvement in this patient's care.      Dilcia Miller, APRN  July 23, 2021  18:51 EDT

## 2021-07-24 PROBLEM — E11.9 TYPE 2 DIABETES MELLITUS (HCC): Status: ACTIVE | Noted: 2021-01-01

## 2021-07-24 PROBLEM — I10 HYPERTENSION: Status: ACTIVE | Noted: 2021-01-01

## 2021-07-24 NOTE — SIGNIFICANT NOTE
Exam confirms with auscultation zero audible heart tones and zero audible respirations. Mr.Ephraim Keith was pronounced dead at 1119.  MD notified by Patient's RN.    Sigrid Cline RN  Clinical House Supervisor  7/24/2021 11:37 EDT

## 2021-07-24 NOTE — NURSING NOTE
Discussed case with Niharika, Stroke Navigator, she does not feel the need to continue with MRI at this time, will update if anything changes. Discussed with son, Luigi, who is primary emergency contact. No questions asked, verbalizes understanding of current plan. Family will be here from out of state tomorrow and at that time they will proceed with palliative/comfort care goals. Updated information in the chart. HETAL and ramona grullon. Will continue to monitor this shift.

## 2021-07-24 NOTE — SIGNIFICANT NOTE
07/24/21 0808   SLP Deferred Reason   SLP Deferred Reason Unable to evaluate, medical status change  (hold-intubated)

## 2021-07-24 NOTE — H&P
Intensive Care Admission Note     Acute CVA (cerebrovascular accident) (CMS/[facility])    History of Present Illness     This is an unfortunate 61-year-old gentleman who had been in his usual state of health and was last known to be completely well last evening.  This morning his wife noted that he was difficult to arouse and had mildly slurred speech.  Due to some recent very busy days of work it was believed that he was tired and needed further rest.  Later in the day she tried to arouse him and unfortunately he was relatively nonresponsive.  He was intubated in route by EMS and has only minimally withdrawn from noxious stimuli.  Work-up in the emergency department showed acute left carotid occlusion with subsequent CVA.  After much discussion between the emergency department and interventional neurology, the decision was made by the family for no further invasive procedures given his poor prognosis.  They would like him to be supported until such time that the remainder of his family can arrive.    Problem List, Surgical History, Family, Social History, and ROS     Patient Active Problem List    Diagnosis    • *Acute CVA (cerebrovascular accident) (CMS/[facility]) [I63.9]    • Acute respiratory failure with hypoxia (CMS/[facility]) [J96.01]    • Left carotid artery occlusion [I65.22]      No past surgical history on file.    Not on File  No current facility-administered medications on file prior to encounter.     No current outpatient medications on file prior to encounter.     MEDICATION LIST AND ALLERGIES REVIEWED.    No family history on file.  Social History     Tobacco Use   • Smoking status: Not on file   Substance Use Topics   • Alcohol use: Not on file   • Drug use: Not on file     FAMILY AND SOCIAL HISTORY REVIEWED.    Review of Systems  Unable to obtain review of systems secondary the patient is intubated and nonresponsive state.    Physical Exam and Clinical Information   /84   Pulse 112   Temp 98.2 °F (36.8 °C)  "(Axillary)   Resp 16   Ht 167.6 cm (66\")   Wt 97.5 kg (215 lb)   SpO2 100%   BMI 34.70 kg/m²   Physical Exam  Constitutional:       Appearance: He is normal weight. He is ill-appearing.      Comments: This is an intubated well-developed man who is nonresponsive   HENT:      Head: Normocephalic and atraumatic.      Comments: Endotracheal tube in place.  NG in place.     Nose: Nose normal.   Eyes:      Comments: Pupils are equal.  They are pinpoint.  They are very minimally reactive to very bright light.   Cardiovascular:      Rate and Rhythm: Normal rate.      Pulses: Normal pulses.      Heart sounds: Normal heart sounds. No murmur heard.     Pulmonary:      Effort: Pulmonary effort is normal. No respiratory distress.      Breath sounds: No wheezing or rales.   Abdominal:      General: Abdomen is flat. Bowel sounds are normal. There is no distension.   Musculoskeletal:      Cervical back: Normal range of motion. No rigidity.   Skin:     General: Skin is warm.      Capillary Refill: Capillary refill takes less than 2 seconds.   Neurological:      Comments: Patient is currently nonresponsive.         Results from last 7 days   Lab Units 07/23/21  1833 07/23/21  1829   WBC 10*3/mm3 23.13*  --    HEMOGLOBIN g/dL 17.6  --    HEMOGLOBIN, POC g/dL  --  17.0   PLATELETS 10*3/mm3 343  --      Results from last 7 days   Lab Units 07/23/21  1825   CREATININE mg/dL 0.80     Estimated Creatinine Clearance: 106 mL/min (by C-G formula based on SCr of 0.8 mg/dL).      Results from last 7 days   Lab Units 07/23/21  1829   PH, ARTERIAL pH units 7.25*     No results found for: LACTATE       I reviewed the patient's results and images.     Impression     Medical Problems     Hospital Problem List     * (Principal) Acute CVA (cerebrovascular accident) (CMS/HCC)    Acute respiratory failure with hypoxia (CMS/HCC)    Left carotid artery occlusion              Plan/Recommendations     The patient is suffered an irreversible and likely " fatal neurological event.  The plan will be to support him on the ventilator until his family is able to arrive from [place].  We however will not escalate care and in the event of cardiac arrest we will not perform CPR.  Maintain current ventilator support.  We will have palliative care consult on the case tomorrow to help with goals of care, family support, and eventual withdrawal of care.  Blood pressure control per stroke protocols.  Orders have been placed for tomorrow morning.    High level of risk due to:  illness with threat to life or bodily function and decision for DNR or to de-escalate care.        Manuel Espinoza MD, [facility]  Pulmonary and Critical Care Medicine  07/23/21 21:31 EDT     CC: Provider, No Known

## 2021-07-24 NOTE — DISCHARGE SUMMARY
Death Summary    Patient name: Serge Keith  CSN: [id number]  MRN: [id number]  : 1960  Today's date: 2021     Date of Admission: 2021  Date and Time of Death:  2021 at 1119    Admitting Physician:  Dr. Espinoza  Primary Care Provider: Provider, No Known  Consultations:   Neurology  Palliative:  ARIN Gray    Admission Diagnosis:  CVA     Diagnoses at the Time of Death:     Large Acute Lt CVA and Rt SIA CVA    Left carotid artery occlusion    Acute respiratory failure with hypoxia (CMS/[facility])    Hypertension    Type 2 diabetes mellitus (CMS/[facility])    History of Present Illness:  This is an unfortunate 61-year-old gentleman who had been in his usual state of health and was last known to be completely well last evening.  This morning his wife noted that he was difficult to arouse and had mildly slurred speech.  Due to some recent very busy days of work it was believed that he was tired and needed further rest.  Later in the day she tried to arouse him and unfortunately he was relatively nonresponsive.  He was intubated in route by EMS and has only minimally withdrawn from noxious stimuli.  Work-up in the emergency department showed acute left carotid occlusion with subsequent CVA.  NIHSS was 27. After much discussion between the emergency department and interventional neurology, the decision was made by the family for no further invasive procedures given his poor prognosis.  They would like him to be supported until such time that the remainder of his family can arrive.    Hospital Course:  He remained on mechanical ventilation.  His NIHSS was 28.  He did not trigger the ventilator on pressure support.  After discussion with his family about his grave prognosis, family wished to make him comfort measures.   He was terminally extubated and he  21 at 1119.    ARIN Antony, AGACNP-BC, FNP-BC  Pulmonary & Critical Care Medicine

## 2021-07-24 NOTE — CONSULTS
Provider, No Known  Consulting physician: Manuel Espinoza  Reason for referral: goc discussion, ACP, withdrawal of interventions  Chief Complaint   Patient presents with   • Stroke     HPI:   62yo male who had been in his usual state of health and was last known to be completely well on 7/22 evening.  On 7/23 morning his wife noted that he was difficult to awaken and had mildly slurred speech.  Prior two days were very busy days of work it was believed that he was tired and needed further rest. Later in the day she found him unresponsive and he had lost control of his bowel and bladder.  EMS was notified, patient intubated in route by EMS and has only minimally withdrawn from noxious stimuli. Work-up in the emergency department showed acute Left carotid occlusion with subsequent CVA.  After much discussion between the emergency department, interventional neurology and family, the decision was made by the family for no further invasive procedures given his poor prognosis.  They would like him to be supported until such time that the remainder of his family can arrive. Family spoke with Dr Espinoza this morning and decision was made for withdrawal of artificial life support.  Symptoms:   Appears restful, no sedation  Advance care planning discussed:  Code Status:   Current Code Status     Date Active Code Status Order ID Comments User Context       7/23/2021 2115 No CPR [id number]  Manuel Espinoza MD ED     Advance Care Planning Activity      Questions for Current Code Status     Question Answer Comment    Code Status No CPR     Medical Interventions (Level of Support Prior to Arrest) Limited     Limited Support to NOT Include Cardioversion/Defibrillation         Advance Directive: no written document  Surrogate decision maker: wife   History reviewed. No pertinent past medical history.  History reviewed. No pertinent surgical history.    Reviewed current scheduled and prn medications for route, type, dose and  frequency.    Current Facility-Administered Medications   Medication Dose Route Frequency Provider Last Rate Last Admin   • acetaminophen (TYLENOL) tablet 325 mg  325 mg Nasogastric Q6H PRN Niharika Ford, ARIN        Or   • acetaminophen (TYLENOL) tablet 650 mg  650 mg Nasogastric Q6H PRN Niharika Ford APRN   650 mg at 07/24/21 0201   • atorvastatin (LIPITOR) tablet 40 mg  40 mg Oral Daily Tariq Valenzuela,        • dextrose (D50W) 25 g/ 50mL Intravenous Solution 25 g  25 g Intravenous Q15 Min PRN Rasheed Oswald, APRN       • dextrose (GLUTOSE) oral gel 15 g  15 g Oral Q15 Min PRN Rasheed Oswald, APRN       • glucagon (human recombinant) (GLUCAGEN DIAGNOSTIC) injection 1 mg  1 mg Subcutaneous Q15 Min PRN Rasheed Oswald, APRN       • insulin regular (humuLIN R,novoLIN R) injection 0-9 Units  0-9 Units Subcutaneous Q6H Rasheed Oswald APRN   2 Units at 07/24/21 0619   • niCARdipine (CARDENE) 20 mg in 200 mL NS infusion  5-15 mg/hr Intravenous Titrated Felipe Forbes MD   Stopped at 07/24/21 0822   • sodium chloride 0.9 % flush 10 mL  10 mL Intravenous PRN Felipe Forbes MD         niCARdipine, 5-15 mg/hr, Last Rate: Stopped (07/24/21 0822)      •  acetaminophen **OR** acetaminophen  •  dextrose  •  dextrose  •  glucagon (human recombinant)  •  sodium chloride  No Known Allergies  Family History   Family history unknown: Yes     Social History     Socioeconomic History   • Marital status: Unknown     Spouse name: Not on file   • Number of children: Not on file   • Years of education: Not on file   • Highest education level: Not on file   Tobacco Use   • Smoking status: Never Smoker   Vaping Use   • Vaping Use: Never assessed   Substance and Sexual Activity   • Alcohol use: Never   • Drug use: Never   • Sexual activity: Defer     Review of Systems - +/- per HPI and symptom review.    PPS: 10%  /88   Pulse 101   Temp (!) 101.8 °F (38.8 °C) (Bladder)   Resp 16   Ht  "167.6 cm (66\")   Wt 86.9 kg (191 lb 9.3 oz)   SpO2 99%   BMI 30.92 kg/m²   86.9 kg (191 lb 9.3 oz) Body mass index is 30.92 kg/m².  Intake & Output (last day)       07/23 0701 - 07/24 0700 07/24 0701 - 07/25 0700    I.V. (mL/kg) 148.4 (1.7)     NG/GT 60     Total Intake(mL/kg) 208.4 (2.4)     Urine (mL/kg/hr) 950     Emesis/NG output 550     Total Output 1500     Net -1291.7               Physical Exam:  General Appearance: No acute distress, ill appearing with mechanical ventilator support  Head: Normocephalic without obvious abnormality, atraumatic  Eyes: Conjunctivae and sclerae normal, no icterus, LUIS ALBERTO  Throat: ET tube  Lungs: Clear to auscultation, respirations regular with ventilator support  Heart: tachycardia  Abdomen: Normal bowel sounds, soft, non-distended, non-tender  Extremities: no redness, no cyanosis, no edema  Pulses: Distal pulses palpable and equal bilaterally  Skin: Warm and dry  Neurological: unresponsive to touch, name, no myoclonus    Reviewed labs and diagnostic results.  Lab Results   Component Value Date    HGBA1C 8.00 (H) 07/23/2021     Results from last 7 days   Lab Units 07/24/21  0559   WBC 10*3/mm3 24.35*   HEMOGLOBIN g/dL 17.0   HEMATOCRIT % 46.5   PLATELETS 10*3/mm3 286     Results from last 7 days   Lab Units 07/24/21  0512   SODIUM mmol/L 138   POTASSIUM mmol/L 4.1   CHLORIDE mmol/L 102   CO2 mmol/L 20.0*   BUN mg/dL 19   CREATININE mg/dL 1.00   CALCIUM mg/dL 10.0   GLUCOSE mg/dL 210*     Results from last 7 days   Lab Units 07/24/21  0512   SODIUM mmol/L 138   POTASSIUM mmol/L 4.1   CHLORIDE mmol/L 102   CO2 mmol/L 20.0*   BUN mg/dL 19   CREATININE mg/dL 1.00   GLUCOSE mg/dL 210*   CALCIUM mg/dL 10.0     Imaging Results (Last 72 Hours)     Procedure Component Value Units Date/Time    XR Abdomen KUB [[id number]] Collected: 07/23/21 2341     Updated: 07/23/21 2343    Narrative:      CR Abdomen 1 Vw    INDICATION:   NG tube placement.    COMPARISON:   None " available    FINDINGS:  AP view of the abdomen. Tip of an NG tube is in the distal gastric body. Bowel gas pattern is within normal limits.      Impression:      NG tube tip in the stomach.    Signer Name: Bijan Recio MD   Signed: 7/23/2021 11:41 PM   Workstation Name: [email or web address]    [facility]    CT Head Without Contrast Stroke Protocol [[id number]] Collected: 07/23/21 1833     Updated: 07/23/21 2327    Narrative:      EXAMINATION: CT HEAD WO CONTRAST  - 07/23/2021     INDICATION: Follow up stroke.     TECHNIQUE: CT head without intervenous contrast, stroke protocol.     The radiation dose reduction device was turned on for each scan per the  ALARA (As Low as Reasonably Achievable) protocol.     COMPARISON: None.     FINDINGS: Abnormal asymmetry of low attenuation and sulcal effacement  extending to the periphery of the cortex left temporoparietal region  concerning for evolving infarction without intra-axial hemorrhage or  extra-axial fluid collection. No midline shift or hydrocephalus. Globes  and orbits unremarkable. Paranasal sinuses and mastoid air cells  demonstrate mucous retention cyst and mucosal edema within the ethmoid  air cells. Patient is intubated with partially visualized endotracheal  tube. Calvarium intact.       Impression:      Low-attenuation area extending to the cortex left  temporoparietal region with sulcal effacement and edema concerning for  evolving left MCA territory infarction.     Scan performed on 07/23/2021 at 1814 hours. Scan report given to ER  physician, Dr. Forbes, in person by Dr. Ramesh at the scanner on  07/23/2021 at 1820 hours.     DICTATED:   07/23/2021  EDITED/ls :   07/23/2021          CT CEREBRAL PERFUSION WITH & WITHOUT CONTRAST [[id number]] Collected: 07/23/21 1849     Updated: 07/23/21 2319    Narrative:      EXAMINATION: CT CEREBRAL PERFUSION WWO CONTRAST - 07/23/2021      INDICATION: Neuro deficit. Evaluate for stroke.     TECHNIQUE: CT  cerebral perfusion with and without intravenous contrast  administration. Multiple parametric maps including mean transit time,  time to drain, cerebral blood flow and cerebral blood volume performed.     The radiation dose reduction device was turned on for each scan per the  ALARA (As Low as Reasonably Achievable) protocol.     COMPARISON: CT stroke protocol noncontrast performed immediately prior.     FINDINGS: Asymmetric abnormal perfusion with prolongation mean transit  time and time to drain throughout the left cerebral hemisphere and in  the posterior right SIA territory with corresponding decreased cerebral  blood flow and decreased cerebral blood volume in the posterior left  temporal occipital region or core infarct in the left posterior MCA  territory and potential PCA territory.       Impression:      Abnormal perfusion with large area of reversible ischemia  involving the left cerebral hemisphere with core infarct or  nonreversible ischemia in the left temporal occipital region as well as  reversible ischemia in the right posterior SIA territory.     DICTATED:   07/23/2021  EDITED/ls :   07/23/2021       CT Angiogram Head w AI Analysis of LVO [[id number]] Collected: 07/23/21 1855     Updated: 07/23/21 2317    Narrative:      EXAMINATION: CT ANGIOGRAM NECK, CT ANGIOGRAM HEAD W AI ANALYSIS OF LVO -  07/23/2021     INDICATION: Follow up stroke.     TECHNIQUE: CT angiogram head and neck with and without intravenous  contrast administration. 2-D and 3-D reconstructions performed.     The radiation dose reduction device was turned on for each scan per the  ALARA (As Low as Reasonably Achievable) protocol.     COMPARISON: CT perfusion and CT stroke noncontrast performed  concurrently.     FINDINGS:      CTA NECK: Normal 3-vessel arch with patent great vessel origins.  Proximal subclavian arteries are patent. Vertebral arteries demonstrate  diminutive caliber bilateral left slightly greater than right  with  irregularities and limited opacification in the bilateral V2 and V3  segments with essentially nonopacified proximal vertebral arteries  proximal V2 segment may represent superimposed atherosclerotic or  disease on top of diminutive caliber. Carotids demonstrate grossly  normal course and branching pattern with atherosclerotic calcific  disease of the carotid bifurcations/ICA origins producing 50% right and  100% left luminal narrowing as measured by NASCET criteria with  progressive lack of opacification approximately 1 cm superior to the  left ICA origin without significant reconstitution of flow in the  cervical portion. Further evaluation below the CTA head portion.  Cervical soft tissues unremarkable. Endotracheal tube in place with lung  apices grossly clear.     CTA HEAD: Moderate to severe atherosclerotic calcific disease of the  bilateral distal internal carotid arteries with nonopacification of the  left distal internal carotid artery appears occluded without flow as  seen continuing from the CTA neck portion above. Anterior cerebral  arteries with irregularities of at least moderate to severe stenoses and  atherosclerotic involvement greatest on the right in the mid and distal  portions A2 and A3 segments and continued distally. Middle cerebral  arteries with irregularities right MCA territory mild to moderate  stenoses of atherosclerotic disease with limited opacification left MCA  particularly within the inferior branch early into branching diminutive  opacification and decreased flow asymmetric throughout the left MCA  territory however bilateral MCA irregularities of atherosclerotic  involvement. Vertebrobasilar system demonstrates limited opacification  of the distal vertebral arteries and basilar artery although posterior  cerebral arteries have some flow right greater than left with moderate  to severe stenoses throughout. Superior sagittal sinus patent.       Impression:      Severe  atherosclerotic disease throughout the CTA head and  neck with diminutive caliber and superimposed disease likely within the  bilateral vertebral arteries, lack of opacification in the proximal V2  segments, and distally throughout the vertebral basilar system and  basilar artery with irregularities throughout the PCA territories.     Occluded left internal carotid artery just distal to its origin with  continued lack of opacification left ICA through the intracranial  portions and only minimal reconstitution of the left MCA with  irregularities throughout the bilateral MCA territories and decreased  opacification corresponding left MCA territory greater than right.  Additional findings of abnormalities and irregularities in the SIA  territory right greater than left of moderate to severe stenoses.     DICTATED:   07/23/2021  EDITED/ls :   07/23/2021          CT Angiogram Neck [[id number]] Collected: 07/23/21 1855     Updated: 07/23/21 2317    Narrative:      EXAMINATION: CT ANGIOGRAM NECK, CT ANGIOGRAM HEAD W AI ANALYSIS OF LVO -  07/23/2021     INDICATION: Follow up stroke.     TECHNIQUE: CT angiogram head and neck with and without intravenous  contrast administration. 2-D and 3-D reconstructions performed.     The radiation dose reduction device was turned on for each scan per the  ALARA (As Low as Reasonably Achievable) protocol.     COMPARISON: CT perfusion and CT stroke noncontrast performed  concurrently.     FINDINGS:      CTA NECK: Normal 3-vessel arch with patent great vessel origins.  Proximal subclavian arteries are patent. Vertebral arteries demonstrate  diminutive caliber bilateral left slightly greater than right with  irregularities and limited opacification in the bilateral V2 and V3  segments with essentially nonopacified proximal vertebral arteries  proximal V2 segment may represent superimposed atherosclerotic or  disease on top of diminutive caliber. Carotids demonstrate grossly  normal course  and branching pattern with atherosclerotic calcific  disease of the carotid bifurcations/ICA origins producing 50% right and  100% left luminal narrowing as measured by NASCET criteria with  progressive lack of opacification approximately 1 cm superior to the  left ICA origin without significant reconstitution of flow in the  cervical portion. Further evaluation below the CTA head portion.  Cervical soft tissues unremarkable. Endotracheal tube in place with lung  apices grossly clear.     CTA HEAD: Moderate to severe atherosclerotic calcific disease of the  bilateral distal internal carotid arteries with nonopacification of the  left distal internal carotid artery appears occluded without flow as  seen continuing from the CTA neck portion above. Anterior cerebral  arteries with irregularities of at least moderate to severe stenoses and  atherosclerotic involvement greatest on the right in the mid and distal  portions A2 and A3 segments and continued distally. Middle cerebral  arteries with irregularities right MCA territory mild to moderate  stenoses of atherosclerotic disease with limited opacification left MCA  particularly within the inferior branch early into branching diminutive  opacification and decreased flow asymmetric throughout the left MCA  territory however bilateral MCA irregularities of atherosclerotic  involvement. Vertebrobasilar system demonstrates limited opacification  of the distal vertebral arteries and basilar artery although posterior  cerebral arteries have some flow right greater than left with moderate  to severe stenoses throughout. Superior sagittal sinus patent.       Impression:      Severe atherosclerotic disease throughout the CTA head and  neck with diminutive caliber and superimposed disease likely within the  bilateral vertebral arteries, lack of opacification in the proximal V2  segments, and distally throughout the vertebral basilar system and  basilar artery with irregularities  throughout the PCA territories.     Occluded left internal carotid artery just distal to its origin with  continued lack of opacification left ICA through the intracranial  portions and only minimal reconstitution of the left MCA with  irregularities throughout the bilateral MCA territories and decreased  opacification corresponding left MCA territory greater than right.  Additional findings of abnormalities and irregularities in the SIA  territory right greater than left of moderate to severe stenoses.     DICTATED:   07/23/2021  EDITED/ls :   07/23/2021          XR Chest 1 View [[id number]] Collected: 07/23/21 2001     Updated: 07/23/21 2003    Narrative:      CHEST X-RAY, 7/23/2021 (19:39)    HISTORY:    61-year-old male in the ED undergoing acute stroke assessment. Endotracheal tube placement.      TECHNIQUE:  AP portable chest x-ray.      FINDINGS:  Newly placed endotracheal tube is in good position in the mid thoracic trachea about 4.1 cm above the marco antonio.    Heart size and pulmonary vascularity are normal. The lungs are clear. No visible pleural effusion.      Impression:      ETT in good position.    Signer Name: Magen Blakely MD   Signed: 7/23/2021 8:01 PM   Workstation Name: [email or web address]    [facility]        Impression: 61 y.o. male with Left ICA occlusion, acute hypoxia respiratory failure  Plan:  Dyspnea  Respiratory secretions    Goals of care discussion for withdrawal of artificial life support with wife, son and two son-in-laws at bedside. Patient's daughters are outside with younger children.  Discussion held about events of patient being unresponsive, current clinical status and decision to make transition to comfort focused plan of care and allow patient to be in more natural state. Informed about making decision based on patient's wishes and process for withdrawal of life prolonging interventions.  Answered questions about the process and body disposition plans.    JORJE had already been notified and ruled out per nursing.   Reviewed use of medications for symptom management of increased work of breathing and excessive respiratory secretions.  Wife and family have made decision to proceed with withdrawal, close family members will visit at bedside.  Offered any spiritual support but declined by family.  Palliative team provide support to pt/family.    Autumn Harrison, APRN  [phone]  07/24/21  09:34 EDT      Time: 60 minutes spent reviewing medical and medication records, assessing and examining patient, discussing with family and nursing staff, answering questions, formulating a plan and documentation of care. > 50% time spent face to face

## 2021-07-24 NOTE — PROGRESS NOTES
Intensive Care Follow-up     Hospital:  LOS: 1 day   Mr. Serge Keith, 61 y.o. male is followed for:   Acute CVA (cerebrovascular accident) (CMS/HCC)            History of present illness:   This is an unfortunate 61-year-old gentleman who had been in his usual state of health and was last known to be completely well last evening.  This morning his wife noted that he was difficult to arouse and had mildly slurred speech.  Due to some recent very busy days of work it was believed that he was tired and needed further rest.  Later in the day she tried to arouse him and unfortunately he was relatively nonresponsive.  He was intubated in route by EMS and has only minimally withdrawn from noxious stimuli.  Work-up in the emergency department showed acute left carotid occlusion with subsequent CVA.  After much discussion between the emergency department and interventional neurology, the decision was made by the family for no further invasive procedures given his poor prognosis.  They would like him to be supported until such time that the remainder of his family can arrive.      Subjective   Interval History:  Patient is on mechanical ventilation this morning, family is requesting palliative care.  I did check the patient on pressure support this morning see if he would trigger the ventilator and he would not.  He is otherwise hemodynamically stable.  He is febrile up to 102.             The patient's past medical, surgical and social history were reviewed and updated in Epic as appropriate.       Objective     Infusions:     Medications:     I reviewed the patient's medications.    Vital Sign Min/Max for last 24 hours  Temp  Min: 98.2 °F (36.8 °C)  Max: 102.4 °F (39.1 °C)   BP  Min: 95/81  Max: 219/109   Pulse  Min: 94  Max: 135   Resp  Min: 16  Max: 18   SpO2  Min: 95 %  Max: 100 %   No data recorded       Input/Output for last 24 hour shift  07/23 0701 - 07/24 0700  In: 208.4 [I.V.:148.4]  Out: 1500 [Urine:950]    FiO2 (%):  [50 %-100 %] 50 %  S RR:  [16] 16  PEEP/CPAP (cm H2O):  [5 cm H20] 5 cm H20  MAP (cm H2O):  [7.1-17] 17   GENERAL : Lying in bed, no acute distress  RESPIRATORY/THORAX : ET tube in place, CTAB  CARDIOVASCULAR : Normal S1/S2, RRR.  No lower ext edema.  GASTROINTESTINAL : Soft, NT/ND. BS x 4 normoactive. No hepatosplenomegaly.  MUSCULOSKELETAL : No cyanosis, clubbing, or ischemia  NEUROLOGICAL: No gag, unable to trigger the ventilator.    Results from last 7 days   Lab Units 07/24/21  0559 07/23/21  1833 07/23/21  1829   WBC 10*3/mm3 24.35* 23.13*  --    HEMOGLOBIN g/dL 17.0 17.6  --    HEMOGLOBIN, POC g/dL  --   --  17.0   PLATELETS 10*3/mm3 286 343  --      Results from last 7 days   Lab Units 07/24/21  0512 07/23/21  1825   SODIUM mmol/L 138  --    POTASSIUM mmol/L 4.1  --    CO2 mmol/L 20.0*  --    BUN mg/dL 19  --    CREATININE mg/dL 1.00 0.80   GLUCOSE mg/dL 210*  --      Estimated Creatinine Clearance: 80.1 mL/min (by C-G formula based on SCr of 1 mg/dL).    Results from last 7 days   Lab Units 07/23/21  1829   PH, ARTERIAL pH units 7.25*         I reviewed the patient's new clinical results.  I reviewed the patient's new imaging results/reports including actual images and agree with reports.       Imaging Results (Last 24 Hours)     Procedure Component Value Units Date/Time    CT CEREBRAL PERFUSION WITH & WITHOUT CONTRAST [[id number]] Collected: 07/23/21 1849     Updated: 07/24/21 1035    Narrative:      EXAMINATION: CT CEREBRAL PERFUSION WWO CONTRAST - 07/23/2021      INDICATION: Neuro deficit. Evaluate for stroke.     TECHNIQUE: CT cerebral perfusion with and without intravenous contrast  administration. Multiple parametric maps including mean transit time,  time to drain, cerebral blood flow and cerebral blood volume performed.     The radiation dose reduction device was turned on for each scan per the  ALARA (As Low as Reasonably Achievable) protocol.     COMPARISON: CT stroke protocol  noncontrast performed immediately prior.     FINDINGS: Asymmetric abnormal perfusion with prolongation mean transit  time and time to drain throughout the left cerebral hemisphere and in  the posterior right SIA territory with corresponding decreased cerebral  blood flow and decreased cerebral blood volume in the posterior left  temporal occipital region or core infarct in the left posterior MCA  territory and potential PCA territory.       Impression:      Abnormal perfusion with large area of reversible ischemia  involving the left cerebral hemisphere with core infarct or  nonreversible ischemia in the left temporal occipital region as well as  reversible ischemia in the right posterior SIA territory.     DICTATED:   07/23/2021  EDITED/ls :   07/23/2021     This report was finalized on 7/24/2021 10:32 AM by Dr. Emanuel Ramesh.       CT Head Without Contrast Stroke Protocol [[id number]] Collected: 07/23/21 1833     Updated: 07/24/21 1035    Narrative:      EXAMINATION: CT HEAD WO CONTRAST  - 07/23/2021     INDICATION: Follow up stroke.     TECHNIQUE: CT head without intervenous contrast, stroke protocol.     The radiation dose reduction device was turned on for each scan per the  ALARA (As Low as Reasonably Achievable) protocol.     COMPARISON: None.     FINDINGS: Abnormal asymmetry of low attenuation and sulcal effacement  extending to the periphery of the cortex left temporoparietal region  concerning for evolving infarction without intra-axial hemorrhage or  extra-axial fluid collection. No midline shift or hydrocephalus. Globes  and orbits unremarkable. Paranasal sinuses and mastoid air cells  demonstrate mucous retention cyst and mucosal edema within the ethmoid  air cells. Patient is intubated with partially visualized endotracheal  tube. Calvarium intact.       Impression:      Low-attenuation area extending to the cortex left  temporoparietal region with sulcal effacement and edema concerning for  evolving  left MCA territory infarction.     Scan performed on 07/23/2021 at 1814 hours. Scan report given to ER  physician, Dr. Forbes, in person by Dr. Ramesh at the scanner on  07/23/2021 at 1820 hours.     DICTATED:   07/23/2021  EDITED/ls :   07/23/2021        This report was finalized on 7/24/2021 10:32 AM by Dr. Emanuel Ramesh.       CT Angiogram Head w AI Analysis of LVO [[id number]] Collected: 07/23/21 1855     Updated: 07/24/21 1035    Narrative:      EXAMINATION: CT ANGIOGRAM NECK, CT ANGIOGRAM HEAD W AI ANALYSIS OF LVO -  07/23/2021     INDICATION: Follow up stroke.     TECHNIQUE: CT angiogram head and neck with and without intravenous  contrast administration. 2-D and 3-D reconstructions performed.     The radiation dose reduction device was turned on for each scan per the  ALARA (As Low as Reasonably Achievable) protocol.     COMPARISON: CT perfusion and CT stroke noncontrast performed  concurrently.     FINDINGS:      CTA NECK: Normal 3-vessel arch with patent great vessel origins.  Proximal subclavian arteries are patent. Vertebral arteries demonstrate  diminutive caliber bilateral left slightly greater than right with  irregularities and limited opacification in the bilateral V2 and V3  segments with essentially nonopacified proximal vertebral arteries  proximal V2 segment may represent superimposed atherosclerotic or  disease on top of diminutive caliber. Carotids demonstrate grossly  normal course and branching pattern with atherosclerotic calcific  disease of the carotid bifurcations/ICA origins producing 50% right and  100% left luminal narrowing as measured by NASCET criteria with  progressive lack of opacification approximately 1 cm superior to the  left ICA origin without significant reconstitution of flow in the  cervical portion. Further evaluation below the CTA head portion.  Cervical soft tissues unremarkable. Endotracheal tube in place with lung  apices grossly clear.     CTA HEAD: Moderate to severe  atherosclerotic calcific disease of the  bilateral distal internal carotid arteries with nonopacification of the  left distal internal carotid artery appears occluded without flow as  seen continuing from the CTA neck portion above. Anterior cerebral  arteries with irregularities of at least moderate to severe stenoses and  atherosclerotic involvement greatest on the right in the mid and distal  portions A2 and A3 segments and continued distally. Middle cerebral  arteries with irregularities right MCA territory mild to moderate  stenoses of atherosclerotic disease with limited opacification left MCA  particularly within the inferior branch early into branching diminutive  opacification and decreased flow asymmetric throughout the left MCA  territory however bilateral MCA irregularities of atherosclerotic  involvement. Vertebrobasilar system demonstrates limited opacification  of the distal vertebral arteries and basilar artery although posterior  cerebral arteries have some flow right greater than left with moderate  to severe stenoses throughout. Superior sagittal sinus patent.       Impression:      Severe atherosclerotic disease throughout the CTA head and  neck with diminutive caliber and superimposed disease likely within the  bilateral vertebral arteries, lack of opacification in the proximal V2  segments, and distally throughout the vertebral basilar system and  basilar artery with irregularities throughout the PCA territories.     Occluded left internal carotid artery just distal to its origin with  continued lack of opacification left ICA through the intracranial  portions and only minimal reconstitution of the left MCA with  irregularities throughout the bilateral MCA territories and decreased  opacification corresponding left MCA territory greater than right.  Additional findings of abnormalities and irregularities in the SIA  territory right greater than left of moderate to severe stenoses.     DICTATED:    07/23/2021  EDITED/ls :   07/23/2021        This report was finalized on 7/24/2021 10:32 AM by Dr. Emanuel Ramesh.       CT Angiogram Neck [[id number]] Collected: 07/23/21 1855     Updated: 07/24/21 1035    Narrative:      EXAMINATION: CT ANGIOGRAM NECK, CT ANGIOGRAM HEAD W AI ANALYSIS OF LVO -  07/23/2021     INDICATION: Follow up stroke.     TECHNIQUE: CT angiogram head and neck with and without intravenous  contrast administration. 2-D and 3-D reconstructions performed.     The radiation dose reduction device was turned on for each scan per the  ALARA (As Low as Reasonably Achievable) protocol.     COMPARISON: CT perfusion and CT stroke noncontrast performed  concurrently.     FINDINGS:      CTA NECK: Normal 3-vessel arch with patent great vessel origins.  Proximal subclavian arteries are patent. Vertebral arteries demonstrate  diminutive caliber bilateral left slightly greater than right with  irregularities and limited opacification in the bilateral V2 and V3  segments with essentially nonopacified proximal vertebral arteries  proximal V2 segment may represent superimposed atherosclerotic or  disease on top of diminutive caliber. Carotids demonstrate grossly  normal course and branching pattern with atherosclerotic calcific  disease of the carotid bifurcations/ICA origins producing 50% right and  100% left luminal narrowing as measured by NASCET criteria with  progressive lack of opacification approximately 1 cm superior to the  left ICA origin without significant reconstitution of flow in the  cervical portion. Further evaluation below the CTA head portion.  Cervical soft tissues unremarkable. Endotracheal tube in place with lung  apices grossly clear.     CTA HEAD: Moderate to severe atherosclerotic calcific disease of the  bilateral distal internal carotid arteries with nonopacification of the  left distal internal carotid artery appears occluded without flow as  seen continuing from the CTA neck portion  above. Anterior cerebral  arteries with irregularities of at least moderate to severe stenoses and  atherosclerotic involvement greatest on the right in the mid and distal  portions A2 and A3 segments and continued distally. Middle cerebral  arteries with irregularities right MCA territory mild to moderate  stenoses of atherosclerotic disease with limited opacification left MCA  particularly within the inferior branch early into branching diminutive  opacification and decreased flow asymmetric throughout the left MCA  territory however bilateral MCA irregularities of atherosclerotic  involvement. Vertebrobasilar system demonstrates limited opacification  of the distal vertebral arteries and basilar artery although posterior  cerebral arteries have some flow right greater than left with moderate  to severe stenoses throughout. Superior sagittal sinus patent.       Impression:      Severe atherosclerotic disease throughout the CTA head and  neck with diminutive caliber and superimposed disease likely within the  bilateral vertebral arteries, lack of opacification in the proximal V2  segments, and distally throughout the vertebral basilar system and  basilar artery with irregularities throughout the PCA territories.     Occluded left internal carotid artery just distal to its origin with  continued lack of opacification left ICA through the intracranial  portions and only minimal reconstitution of the left MCA with  irregularities throughout the bilateral MCA territories and decreased  opacification corresponding left MCA territory greater than right.  Additional findings of abnormalities and irregularities in the SIA  territory right greater than left of moderate to severe stenoses.     DICTATED:   07/23/2021  EDITED/ls :   07/23/2021        This report was finalized on 7/24/2021 10:32 AM by Dr. Emanuel Ramesh.       XR Abdomen KUB [[id number]] Collected: 07/23/21 2341     Updated: 07/23/21 2343    Narrative:      RAMA  Abdomen 1 Vw    INDICATION:   NG tube placement.    COMPARISON:   None available    FINDINGS:  AP view of the abdomen. Tip of an NG tube is in the distal gastric body. Bowel gas pattern is within normal limits.      Impression:      NG tube tip in the stomach.    Signer Name: Bijan Recio MD   Signed: 7/23/2021 11:41 PM   Workstation Name: [email or web address]    [facility]    XR Chest 1 View [[id number]] Collected: 07/23/21 2001     Updated: 07/23/21 2003    Narrative:      CHEST X-RAY, 7/23/2021 (19:39)    HISTORY:    61-year-old male in the ED undergoing acute stroke assessment. Endotracheal tube placement.      TECHNIQUE:  AP portable chest x-ray.      FINDINGS:  Newly placed endotracheal tube is in good position in the mid thoracic trachea about 4.1 cm above the marco antonio.    Heart size and pulmonary vascularity are normal. The lungs are clear. No visible pleural effusion.      Impression:      ETT in good position.    Signer Name: Magen Blakely MD   Signed: 7/23/2021 8:01 PM   [email or web address]    [facility]          Assessment/Plan   Impression        Acute CVA (cerebrovascular accident) (CMS/HCC)    Acute respiratory failure with hypoxia (CMS/HCC)    Left carotid artery occlusion       Plan        61-year-old male.  He presented [facility] with an acute left CVA, outside the TPA window.  Patient's family ultimately decided that the patient would not want aggressive measures and instead would like palliative care/hospice.  I discussed with them the patient's critical illness.  He is currently on mechanical ventilation.  The testing pressure support as they were asking if the patient will be able to get home to have hospice there, unfortunately patient is not triggering the ventilator symptomatic severe neurological damage.  I think is unlikely the patient would be able to tolerate the transport.  I minimal recommend extubation here in the  ICU and that if he were to survive extended period of time to consider transfer but I think this is very unlikely.  Palliative care has been consulted and is going to see the patient this morning and likely move to hospice.    Plan of care and goals reviewed with multidisciplinary/antibiotic stewardship team during rounds.   I discussed the patient's findings and my recommendations with family and nursing staff     High level of risk due to:  illness with threat to life or bodily function.      Kodak Valenzuela, DO  Pulmonary, Critical care and Sleep Medicine

## 2021-07-24 NOTE — PLAN OF CARE
Goal Outcome Evaluation:  Plan of Care Reviewed With: patient        Progress: no change  Outcome Summary: Patient admit to [facility] with dx: left MCA with ICA occlussion. Arrived intubated. Initial NIH on admit 28, with patient only responding to painful stimuli with absent corneal, pupillary, and gag reflexes. Family has chosen to not proceed with any intervention and patient made DNR, likely will move towards palliative/comfort care after further family [place] later today. Febrile overnight, TMAX 102.4, not responsive to Tylenol via NG. Ice packs utilized with little effect. Perez cath patent to bedside drainage. Will continue to monitor this shift.     **ADDENDUM 0550**  Restarted Cardene at 5mg and had to titrate per order up to 10mg to get SBP <180 again after oral care this AM. No other neurologic changes noted.